# Patient Record
Sex: MALE | Race: WHITE | NOT HISPANIC OR LATINO | Employment: STUDENT | URBAN - METROPOLITAN AREA
[De-identification: names, ages, dates, MRNs, and addresses within clinical notes are randomized per-mention and may not be internally consistent; named-entity substitution may affect disease eponyms.]

---
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
## 2022-12-21 ENCOUNTER — OFFICE VISIT (OUTPATIENT)
Dept: FAMILY MEDICINE | Facility: CLINIC | Age: 22
End: 2022-12-21
Payer: COMMERCIAL

## 2022-12-21 ENCOUNTER — HOSPITAL ENCOUNTER (OUTPATIENT)
Dept: RADIOLOGY | Facility: HOSPITAL | Age: 22
Discharge: HOME | End: 2022-12-21
Attending: STUDENT IN AN ORGANIZED HEALTH CARE EDUCATION/TRAINING PROGRAM
Payer: COMMERCIAL

## 2022-12-21 VITALS
HEART RATE: 90 BPM | SYSTOLIC BLOOD PRESSURE: 134 MMHG | TEMPERATURE: 98 F | WEIGHT: 128.5 LBS | HEIGHT: 70 IN | RESPIRATION RATE: 16 BRPM | BODY MASS INDEX: 18.4 KG/M2 | DIASTOLIC BLOOD PRESSURE: 88 MMHG

## 2022-12-21 DIAGNOSIS — Z11.4 SCREENING FOR HIV (HUMAN IMMUNODEFICIENCY VIRUS): ICD-10-CM

## 2022-12-21 DIAGNOSIS — D16.22 OSTEOCHONDROMA OF LEFT FEMUR: ICD-10-CM

## 2022-12-21 DIAGNOSIS — G89.29 CHRONIC PAIN OF LEFT KNEE: ICD-10-CM

## 2022-12-21 DIAGNOSIS — Z00.00 ANNUAL PHYSICAL EXAM: Primary | ICD-10-CM

## 2022-12-21 DIAGNOSIS — Z11.59 NEED FOR HEPATITIS C SCREENING TEST: ICD-10-CM

## 2022-12-21 DIAGNOSIS — M25.562 CHRONIC PAIN OF LEFT KNEE: ICD-10-CM

## 2022-12-21 DIAGNOSIS — R41.840 DIFFICULTY CONCENTRATING: ICD-10-CM

## 2022-12-21 PROBLEM — M25.511 RIGHT SHOULDER PAIN: Status: RESOLVED | Noted: 2017-04-05 | Resolved: 2022-12-21

## 2022-12-21 PROCEDURE — 73552 X-RAY EXAM OF FEMUR 2/>: CPT | Mod: 26,LT,, | Performed by: STUDENT IN AN ORGANIZED HEALTH CARE EDUCATION/TRAINING PROGRAM

## 2022-12-21 PROCEDURE — 3008F PR BODY MASS INDEX (BMI) DOCUMENTED: ICD-10-PCS | Mod: CPTII,S$GLB,, | Performed by: STUDENT IN AN ORGANIZED HEALTH CARE EDUCATION/TRAINING PROGRAM

## 2022-12-21 PROCEDURE — 99214 OFFICE O/P EST MOD 30 MIN: CPT | Mod: 25,S$GLB,, | Performed by: STUDENT IN AN ORGANIZED HEALTH CARE EDUCATION/TRAINING PROGRAM

## 2022-12-21 PROCEDURE — 73560 XR KNEE ORTHO LEFT: ICD-10-PCS | Mod: 26,RT,, | Performed by: STUDENT IN AN ORGANIZED HEALTH CARE EDUCATION/TRAINING PROGRAM

## 2022-12-21 PROCEDURE — 99385 PR PREVENTIVE VISIT,NEW,18-39: ICD-10-PCS | Mod: S$GLB,,, | Performed by: STUDENT IN AN ORGANIZED HEALTH CARE EDUCATION/TRAINING PROGRAM

## 2022-12-21 PROCEDURE — 73552 XR FEMUR 2 VIEW LEFT: ICD-10-PCS | Mod: 26,LT,, | Performed by: STUDENT IN AN ORGANIZED HEALTH CARE EDUCATION/TRAINING PROGRAM

## 2022-12-21 PROCEDURE — 3075F SYST BP GE 130 - 139MM HG: CPT | Mod: CPTII,S$GLB,, | Performed by: STUDENT IN AN ORGANIZED HEALTH CARE EDUCATION/TRAINING PROGRAM

## 2022-12-21 PROCEDURE — 3079F DIAST BP 80-89 MM HG: CPT | Mod: CPTII,S$GLB,, | Performed by: STUDENT IN AN ORGANIZED HEALTH CARE EDUCATION/TRAINING PROGRAM

## 2022-12-21 PROCEDURE — 1159F PR MEDICATION LIST DOCUMENTED IN MEDICAL RECORD: ICD-10-PCS | Mod: CPTII,S$GLB,, | Performed by: STUDENT IN AN ORGANIZED HEALTH CARE EDUCATION/TRAINING PROGRAM

## 2022-12-21 PROCEDURE — 73562 XR KNEE ORTHO LEFT: ICD-10-PCS | Mod: 26,LT,, | Performed by: STUDENT IN AN ORGANIZED HEALTH CARE EDUCATION/TRAINING PROGRAM

## 2022-12-21 PROCEDURE — 73560 X-RAY EXAM OF KNEE 1 OR 2: CPT | Mod: 26,RT,, | Performed by: STUDENT IN AN ORGANIZED HEALTH CARE EDUCATION/TRAINING PROGRAM

## 2022-12-21 PROCEDURE — 3079F PR MOST RECENT DIASTOLIC BLOOD PRESSURE 80-89 MM HG: ICD-10-PCS | Mod: CPTII,S$GLB,, | Performed by: STUDENT IN AN ORGANIZED HEALTH CARE EDUCATION/TRAINING PROGRAM

## 2022-12-21 PROCEDURE — 99385 PREV VISIT NEW AGE 18-39: CPT | Mod: S$GLB,,, | Performed by: STUDENT IN AN ORGANIZED HEALTH CARE EDUCATION/TRAINING PROGRAM

## 2022-12-21 PROCEDURE — 73560 X-RAY EXAM OF KNEE 1 OR 2: CPT | Mod: TC,PO,RT

## 2022-12-21 PROCEDURE — 73552 X-RAY EXAM OF FEMUR 2/>: CPT | Mod: TC,PO,LT

## 2022-12-21 PROCEDURE — 99999 PR PBB SHADOW E&M-NEW PATIENT-LVL V: CPT | Mod: PBBFAC,,, | Performed by: STUDENT IN AN ORGANIZED HEALTH CARE EDUCATION/TRAINING PROGRAM

## 2022-12-21 PROCEDURE — 3075F PR MOST RECENT SYSTOLIC BLOOD PRESS GE 130-139MM HG: ICD-10-PCS | Mod: CPTII,S$GLB,, | Performed by: STUDENT IN AN ORGANIZED HEALTH CARE EDUCATION/TRAINING PROGRAM

## 2022-12-21 PROCEDURE — 73562 X-RAY EXAM OF KNEE 3: CPT | Mod: 26,LT,, | Performed by: STUDENT IN AN ORGANIZED HEALTH CARE EDUCATION/TRAINING PROGRAM

## 2022-12-21 PROCEDURE — 99999 PR PBB SHADOW E&M-NEW PATIENT-LVL V: ICD-10-PCS | Mod: PBBFAC,,, | Performed by: STUDENT IN AN ORGANIZED HEALTH CARE EDUCATION/TRAINING PROGRAM

## 2022-12-21 PROCEDURE — 3008F BODY MASS INDEX DOCD: CPT | Mod: CPTII,S$GLB,, | Performed by: STUDENT IN AN ORGANIZED HEALTH CARE EDUCATION/TRAINING PROGRAM

## 2022-12-21 PROCEDURE — 99214 PR OFFICE/OUTPT VISIT, EST, LEVL IV, 30-39 MIN: ICD-10-PCS | Mod: 25,S$GLB,, | Performed by: STUDENT IN AN ORGANIZED HEALTH CARE EDUCATION/TRAINING PROGRAM

## 2022-12-21 PROCEDURE — 1159F MED LIST DOCD IN RCRD: CPT | Mod: CPTII,S$GLB,, | Performed by: STUDENT IN AN ORGANIZED HEALTH CARE EDUCATION/TRAINING PROGRAM

## 2022-12-21 NOTE — PROGRESS NOTES
Problem List Items Addressed This Visit          Neuro    Difficulty concentrating    Overview     Chronic hx; no previous dx ADD/ADHD  - will send for formal evaluation  - if dx is confirmed, will have to choose nonstimulant form of medication, in setting of low weight         Relevant Orders    Ambulatory referral/consult to Psychiatry       Oncology    Osteochondroma of left femur    Overview     dxd in 10th grade   Reports previously followed with ortho; however, was advised no need to f/u unless concerns. Reports L knee pain few days ago  - XR L femur and knee         Relevant Orders    X-Ray Femur 2 View Left    X-Ray Knee 3 View Left       Endocrine    Adult BMI <19 kg/sq m    Overview     Reports he has had low weight for years, family is thin and tall  Labs ordered  Advised to increase protein consumption  Prn follow up          Relevant Orders    Tissue transglutaminase, IgA    IgA       Orthopedic    Chronic pain of left knee    Overview     Hx of osteochondroma of L distal, medial femur   New onset L knee pain     - will send for XR L knee   - prn tylenol, heat/ice therapy, stretches   - ED precautions discussed with pt. Pt voiced understanding               Other    Annual physical exam - Primary    Relevant Orders    CBC Auto Differential    Comprehensive Metabolic Panel    Hemoglobin A1C    Lipid Panel    TSH     Other Visit Diagnoses       Screening for HIV (human immunodeficiency virus)        Relevant Orders    HIV 1/2 Ag/Ab (4th Gen)    Need for hepatitis C screening test        Relevant Orders    Hepatitis C Antibody              Patient ID: Toni Briones is a 22 y.o. male.    Chief Complaint:  establish care    Patient is here to establish care. Has a hx of  has a past medical history of ADHD (attention deficit hyperactivity disorder) and Anxiety.     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 Also working to become a .     Has hx of trouble focusing since grade school. Denies previous  "formal testing of ADD. Has a 1yr old son. He is .     Reports he has been trying to gain weight. Reports he averages 125-130 lb. Reports chronic reduced appetite. Reports older brother is 6'2" 120 lbs. Reports marijuana helps appetite. Reports hx of pancreatic laceration as a kid. Reports intermittent abd pain with eating (worse with , no diarrhea.     Breakfast - none  Snacks - chips  Lunch - wedges, mozzarella sticks   Snacks - none  Dinner - none     Denies fevers, chills, chest pain, SOB, fatigue, abdominal pain, nausea, vomiting, dysuria, hematuria, hematochezia, or melena.       No personal history of colon cancer, hematochezia, melena, crohn's, ulcerative colitis; No family history of colon cancer.      Health Maintenance Topics with due status: Not Due       Topic Last Completion Date    TETANUS VACCINE 09/16/2013        ==============================================  History reviewed.   Health Maintenance Due   Topic Date Due    Hepatitis C Screening  Never done    Lipid Panel  Never done    HIV Screening  Never done    COVID-19 Vaccine (3 - Booster for Pfizer series) 12/13/2021       Past Medical History:  Past Medical History:   Diagnosis Date    ADHD (attention deficit hyperactivity disorder)     Anxiety      Past Surgical History:   Procedure Laterality Date    APPENDECTOMY      extra digit sy      INGUINAL HERNIA REPAIR      picc line--lacerated pancreas       Review of patient's allergies indicates:  No Known Allergies  No current outpatient medications on file prior to visit.     No current facility-administered medications on file prior to visit.     Social History     Socioeconomic History    Marital status: Single   Tobacco Use    Smoking status: Every Day     Types: Vaping with nicotine   Substance and Sexual Activity    Alcohol use: Never    Drug use: Yes     Types: Marijuana    Sexual activity: Yes     Partners: Female   Social History Narrative    Lives in [place] with parents and 3 " siblings. 11th grader @ [employer]      Family History   Problem Relation Age of Onset    Scoliosis Mother     Broken bones Mother     Broken bones Father     Broken bones Maternal Grandmother     Osteoporosis Paternal Grandmother     Diabetes Paternal Grandmother     Diabetes Paternal Grandfather           Review of Systems   12 point review of systems per hpi, otherwise negative         Objective:    Nursing note and vitals reviewed.  Vitals:    12/21/22 1256   BP: 134/88   Pulse: 90   Resp: 16   Temp: 98.2 °F (36.8 °C)     Body mass index is 18.44 kg/m².     Physical Exam   Constitutional:oriented to person, place, and time. appears well-developed and well-nourished. No distress.   HENT: WNL  Head: Normocephalic and atraumatic.   Eyes: Pupils are equal, round, and reactive to light. EOM are normal.   Neck: Normal range of motion. Neck supple.   Cardiovascular: Normal rate, regular rhythm, normal heart sounds and intact distal pulses.   No murmur heard.  Pulmonary/Chest: Effort normal and breath sounds normal. No respiratory distress. no wheezes.   GI: soft, non distended, no ttp, no rebound/guarding  Musculoskeletal: Normal range of motion. no edema.   LLE: 3cm smooth nodular lesion to distal medial femur  L Knee Exam:  Normal gait, no effusion, warmth, erythema  ROM:  full rom  Strength: 5/5 in all planes  ACL, PCL, MCL, LCL wnl  Anterior/posterior drawer sign: negative   Quadriceps tendon: no ttp  Patellar tendon: no ttp  Patella: no ttp, not ballottable   Tibial plateau: no pain to medial or lateral joint line  Varus/valgus strain: no pain elicited  Neurovascularly intact   Neurological: alert and oriented to person, place, and time. No cranial nerve deficit.   Skin: Skin is warm and dry. Capillary refill takes less than 2 seconds.   Psychiatric: normal mood and affect. behavior is normal.           Anahy Messina MD    We Offer Telehealth & Same Day Appointments!   Book your Telehealth appointment with me  through my nurse or   Clinic appointments on [facility]!  [phone]     To Schedule appointments online, go to [email or web address]: [email or web address]

## 2022-12-21 NOTE — PROGRESS NOTES
I have sent a msg to patient with the following interpretation (see below):    XR femur - Unchanged osteochondroma

## 2022-12-21 NOTE — PROGRESS NOTES
I have sent a msg to patient with the following interpretation (see below):    XR knee normal  with known osteochondroma, no changes

## 2022-12-23 ENCOUNTER — TELEPHONE (OUTPATIENT)
Dept: PSYCHIATRY | Facility: CLINIC | Age: 22
End: 2022-12-23
Payer: COMMERCIAL

## 2022-12-23 NOTE — TELEPHONE ENCOUNTER
Patient called to set appointment, Patient does have referral in chart, Avised patient of paper work needed.

## 2023-01-05 ENCOUNTER — TELEPHONE (OUTPATIENT)
Dept: PSYCHIATRY | Facility: CLINIC | Age: 23
End: 2023-01-05
Payer: COMMERCIAL

## 2023-01-05 ENCOUNTER — TELEPHONE (OUTPATIENT)
Dept: FAMILY MEDICINE | Facility: CLINIC | Age: 23
End: 2023-01-05
Payer: COMMERCIAL

## 2023-01-05 NOTE — TELEPHONE ENCOUNTER
----- Message from Pranay Perdomo sent at 1/5/2023  2:14 PM CST -----  Contact: Patient  Toni Briones would like  call back at [phone], in regards to if he can be referred to someone else in psychiatry. Pt states it has been too difficult to get an appointment scheduled.

## 2023-01-05 NOTE — TELEPHONE ENCOUNTER
Patient was call to schedule a n/p appointment . Pt state he's coming in for ADHD testing. The referral has been defer to Debbie.

## 2023-01-05 NOTE — TELEPHONE ENCOUNTER
Spoke with pt, states he missed a call from you and has been trying to contact your office to get schedule. Please reach out to the pt to schedule an appointment. Thank you.

## 2023-03-27 PROBLEM — Z00.00 ANNUAL PHYSICAL EXAM: Status: RESOLVED | Noted: 2022-12-21 | Resolved: 2023-03-27

## 2023-04-13 ENCOUNTER — TELEPHONE (OUTPATIENT)
Dept: PSYCHIATRY | Facility: CLINIC | Age: 23
End: 2023-04-13
Payer: COMMERCIAL

## 2025-03-17 ENCOUNTER — OFFICE VISIT (OUTPATIENT)
Dept: SURGERY | Facility: CLINIC | Age: 25
End: 2025-03-17
Payer: MEDICAID

## 2025-03-17 VITALS
HEIGHT: 70 IN | SYSTOLIC BLOOD PRESSURE: 130 MMHG | WEIGHT: 156.5 LBS | TEMPERATURE: 98 F | HEART RATE: 77 BPM | BODY MASS INDEX: 22.41 KG/M2 | DIASTOLIC BLOOD PRESSURE: 78 MMHG

## 2025-03-17 DIAGNOSIS — D16.9 OSTEOCHONDROMA: Primary | ICD-10-CM

## 2025-03-17 PROCEDURE — 3008F BODY MASS INDEX DOCD: CPT | Mod: CPTII,,, | Performed by: STUDENT IN AN ORGANIZED HEALTH CARE EDUCATION/TRAINING PROGRAM

## 2025-03-17 PROCEDURE — 99999 PR PBB SHADOW E&M-EST. PATIENT-LVL III: CPT | Mod: PBBFAC,,, | Performed by: STUDENT IN AN ORGANIZED HEALTH CARE EDUCATION/TRAINING PROGRAM

## 2025-03-17 PROCEDURE — 1159F MED LIST DOCD IN RCRD: CPT | Mod: CPTII,,, | Performed by: STUDENT IN AN ORGANIZED HEALTH CARE EDUCATION/TRAINING PROGRAM

## 2025-03-17 PROCEDURE — 99202 OFFICE O/P NEW SF 15 MIN: CPT | Mod: S$PBB,,, | Performed by: STUDENT IN AN ORGANIZED HEALTH CARE EDUCATION/TRAINING PROGRAM

## 2025-03-17 PROCEDURE — 3075F SYST BP GE 130 - 139MM HG: CPT | Mod: CPTII,,, | Performed by: STUDENT IN AN ORGANIZED HEALTH CARE EDUCATION/TRAINING PROGRAM

## 2025-03-17 PROCEDURE — 99213 OFFICE O/P EST LOW 20 MIN: CPT | Mod: PBBFAC,PO | Performed by: STUDENT IN AN ORGANIZED HEALTH CARE EDUCATION/TRAINING PROGRAM

## 2025-03-17 PROCEDURE — 3078F DIAST BP <80 MM HG: CPT | Mod: CPTII,,, | Performed by: STUDENT IN AN ORGANIZED HEALTH CARE EDUCATION/TRAINING PROGRAM

## 2025-03-17 NOTE — PROGRESS NOTES
Patient presents with worsening symptoms in the left medial leg.  He has a history of an osteochondroma and was previously seen by Orthopedics.      Discuss that he would need to follow-up with an orthopedic surgeon to discuss potential resection for worsening symptoms.